# Patient Record
Sex: FEMALE | Race: WHITE | NOT HISPANIC OR LATINO | Employment: UNEMPLOYED | URBAN - METROPOLITAN AREA
[De-identification: names, ages, dates, MRNs, and addresses within clinical notes are randomized per-mention and may not be internally consistent; named-entity substitution may affect disease eponyms.]

---

## 2017-04-04 ENCOUNTER — ALLSCRIPTS OFFICE VISIT (OUTPATIENT)
Dept: OTHER | Facility: OTHER | Age: 67
End: 2017-04-04

## 2017-06-27 ENCOUNTER — ALLSCRIPTS OFFICE VISIT (OUTPATIENT)
Dept: OTHER | Facility: OTHER | Age: 67
End: 2017-06-27

## 2017-10-20 ENCOUNTER — OUTPATIENT (OUTPATIENT)
Dept: OUTPATIENT SERVICES | Facility: HOSPITAL | Age: 67
LOS: 1 days | Discharge: HOME | End: 2017-10-20

## 2017-10-20 DIAGNOSIS — E21.3 HYPERPARATHYROIDISM, UNSPECIFIED: ICD-10-CM

## 2017-10-20 DIAGNOSIS — Q62.11 CONGENITAL OCCLUSION OF URETEROPELVIC JUNCTION: ICD-10-CM

## 2017-10-30 ENCOUNTER — ALLSCRIPTS OFFICE VISIT (OUTPATIENT)
Dept: OTHER | Facility: OTHER | Age: 67
End: 2017-10-30

## 2018-01-10 ENCOUNTER — ALLSCRIPTS OFFICE VISIT (OUTPATIENT)
Dept: OTHER | Facility: OTHER | Age: 68
End: 2018-01-10

## 2018-01-10 NOTE — PSYCH
Psych Med Mgmt    Appearance: was calm and cooperative and good eye contact  Observed mood: depressed and anxious  Observed mood: affect was tearful  Speech: a normal rate  Thought processes: coherent/organized  Hallucinations: no hallucinations present  Thought Content: no delusions  Abnormal Thoughts: The patient has no suicidal thoughts  Orientation: The patient is oriented to person, place and time  Recent and Remote Memory: short term memory intact and long term memory intact  Attention Span And Concentration: concentration intact  Insight: Insight intact  Judgment: Her judgment was intact  Treatment Recommendations: discontinues Klonopin and start Valium 2 mg to be taken at nighttime for insomnia if needed  She reports normal appetite, normal energy level, no weight change and decrease in number of sleep hours   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 [employer]  She is resentful of the administration and her peers, and reports that she is not able to move past her feelings of being mistreated and taken advantage of  Reports that she feels that she is at a loss sometimes because she invested so much into her career that she has little to nothing to look forward to now  Her  is still residing in [place], and she is working on packing up the belongings from the house that she is living in now so that she can move in with him in the near future  Reports that she gets racing thoughts at night and is unable to fall asleep  Klonopin has been ineffective, and patient did not like Trazodone when trialed on it in the past  She previously took Valium for sleep, which was helpful and would like to try that  No SI  Assessment    1  Recurrent major depressive disorder (296 30) (F33 9)   2  Attention-deficit/hyperactivity disorder (314 01) (F90 9)    Plan    1  Methylphenidate HCl ER (CD) 40 MG Oral Capsule Extended Release;  Take 1   capsule twice daily    2  DiazePAM 2 MG Oral Tablet; TAKE 1 TABLET AT BEDTIME AS NEEDED FOR SLEEP    Review of Systems    Constitutional: No fever, no chills, feels well, no tiredness, no recent weight gain or loss  Cardiovascular: no complaints of slow or fast heart rate, no chest pain, no palpitations  Respiratory: no complaints of shortness of breath, no wheezing, no dyspnea on exertion  Gastrointestinal: no complaints of abdominal pain, no constipation, no nausea, no diarrhea, no vomiting  Genitourinary: no complaints of dysuria, no incontinence, no pelvic pain, no urinary frequency  Musculoskeletal: no complaints of arthralgia, no myalgias, no limb pain, no joint stiffness  Integumentary: no complaints of skin rash, no itching, no dry skin  Neurological: no complaints of headache, no confusion, no numbness, no dizziness  Active Problems    1  Attention-deficit/hyperactivity disorder (314 01) (F90 9)   2  Recurrent major depressive disorder (296 30) (F33 9)    Allergies    1  Erythromycin Derivatives   2  Penicillins   3  Sulfa Drugs    Current Meds   1  FluvoxaMINE Maleate 100 MG Oral Tablet; TAKE 2 TABLETS BY MOUTH AT NIGHT; Therapy: 33IIU4250 to (Evaluate:39Ulm9512)  Requested for: 89NRL9491; Last   Rx:08Jun2017 Ordered   2  Methylphenidate HCl ER (CD) 40 MG Oral Capsule Extended Release; Take 1 capsule   twice daily; Therapy: 90VBH4050 to (Last Rx:04Apr2017) Ordered    Social History    · Former smoker (W68 99) (Q25 128)    End of Encounter Meds    1  Methylphenidate HCl ER (CD) 40 MG Oral Capsule Extended Release; Take 1 capsule   twice daily; Therapy: 53FPQ5447 to (Evaluate:18Vxv2717); Last Rx:27Jun2017 Ordered    2  DiazePAM 2 MG Oral Tablet; TAKE 1 TABLET AT BEDTIME AS NEEDED FOR SLEEP; Therapy: 36GYB3968 to (Evaluate:75Vov2089); Last Rx:27Jun2017 Ordered   3  FluvoxaMINE Maleate 100 MG Oral Tablet; TAKE 2 TABLETS BY MOUTH AT NIGHT;    Therapy: 05FTS8782 to (Evaluate:58Qpx7699)  Requested for: 28HPC4418; Last   Rx:15Peh9783 Ordered    Future Appointments    Date/Time Provider Specialty Site   09/26/2017 03:30 PM Nilsa Reid [facility] Psychiatry [facility]     Signatures   Electronically signed by : Diana Bonilla [facility]; Jun 27 2017  3:58PM EST                       (Author)    Electronically signed by : Isis Yarbrough MD; Jun 27 2017  5:00PM EST

## 2018-01-10 NOTE — PSYCH
Psych Med Mgmt    Appearance: was calm and cooperative  Observed mood: mood appropriate  Observed mood: affect appropriate  Speech: a normal rate  Thought processes: coherent/organized  Hallucinations: no hallucinations present  Thought Content: no delusions  Abnormal Thoughts: The patient has no suicidal thoughts and no homicidal thoughts  Orientation: The patient is oriented to person, place and time  Recent and Remote Memory: short term memory intact and long term memory intact  Attention Span And Concentration: concentration intact  Insight: Insight intact  Judgment: Her judgment was intact  Treatment Recommendations: continue of current medications and follow up in 3 months  She reports normal appetite, normal energy level, no weight change and normal number of sleep hours  Patient quit her job in December because she felt that it was too stressful  However, continues to allow her job to stress her out, as she has negative thoughts toward [employer]  She reports that she is 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 " She says that the adjustment has been difficult because she and her  have differences in tv shows, sleeping habits, etc; however, they are working through things well  She reports no issues with sleeping and eating  Assessment    1  Attention-deficit/hyperactivity disorder (314 01) (F90 9)   2  Recurrent major depressive disorder (296 30) (F33 9)    Plan    1  Methylphenidate HCl ER (CD) 40 MG Oral Capsule Extended Release; Take 1   capsule twice daily    2  From  FluvoxaMINE Maleate 100 MG Oral Tablet TAKE 3 TABLETS BY MOUTH   AT NIGHT To FluvoxaMINE Maleate 100 MG Oral Tablet TAKE 2 TABLETS AT BEDTIME   3  ClonazePAM 2 MG Oral Tablet; Take 1 tablet by mouth at bedtime    Review of Systems    Constitutional: No fever, no chills, feels well, no tiredness, no recent weight gain or loss     Cardiovascular: no complaints of slow or fast heart rate, no chest pain, no palpitations  Respiratory: no complaints of shortness of breath, no wheezing, no dyspnea on exertion  Gastrointestinal: no complaints of abdominal pain, no constipation, no nausea, no diarrhea, no vomiting  Genitourinary: no complaints of dysuria, no incontinence, no pelvic pain, no urinary frequency  Musculoskeletal: no complaints of arthralgia, no myalgias, no limb pain, no joint stiffness  Integumentary: no complaints of skin rash, no itching, no dry skin  Neurological: no complaints of headache, no confusion, no numbness, no dizziness  Active Problems    1  Attention-deficit/hyperactivity disorder (314 01) (F90 9)   2  Recurrent major depressive disorder (296 30) (F33 9)    Allergies    1  Erythromycin Derivatives   2  Penicillins   3  Sulfa Drugs    Current Meds   1  ClonazePAM 2 MG Oral Tablet; Take 1 tablet by mouth at bedtime; Therapy: 29APR6856 to (Evaluate:05Ryz0688)  Requested for: 21Nov2016; Last   Rx:21Nov2016 Ordered   2  FluvoxaMINE Maleate 100 MG Oral Tablet; TAKE 3 TABLETS BY MOUTH AT NIGHT; Therapy: 34NHE0094 to (Evaluate:74Ciw2966)  Requested for: 21Nov2016; Last   Rx:21Nov2016 Ordered   3  Methylphenidate HCl ER (CD) 40 MG Oral Capsule Extended Release; Take 1 capsule   twice daily; Therapy: 36HXL9038 to (Last Rx:21Nov2016) Ordered    Social History    · Former smoker (P18 56) (X29 010)    End of Encounter Meds    1  Methylphenidate HCl ER (CD) 40 MG Oral Capsule Extended Release; Take 1 capsule   twice daily; Therapy: 02ACC2456 to (Last Rx:04Apr2017) Ordered    2  ClonazePAM 2 MG Oral Tablet; Take 1 tablet by mouth at bedtime; Therapy: 64SYQ4458 to (Evaluate:36Ico4955)  Requested for: 04Apr2017; Last   Rx:04Apr2017 Ordered   3  FluvoxaMINE Maleate 100 MG Oral Tablet; TAKE 2 TABLETS AT BEDTIME;    Therapy: 81RKM9421 to ([facility])  Requested for: 04Apr2017; Last   Rx:04Apr2017 Ordered    Future Appointments    Date/Time Provider Specialty Site   06/27/2017 03:00 PM Nasima Perez [facility] Psychiatry [address] [facility]     Signatures   Electronically signed by : MT Desir;  Apr 4 2017  5:12PM EST                       (Author)    Electronically signed by : Kevin Riley MD; Apr 5 2017  4:49PM EST

## 2018-01-12 NOTE — PSYCH
Psych Med Mgmt    Appearance: was calm and cooperative and adequate hygiene and grooming  Observed mood: depressed  Observed mood: affect was constricted and affect was tearful  Speech: a normal rate  Thought processes: coherent/organized  Hallucinations: no hallucinations present  Thought Content: no delusions  Abnormal Thoughts: The patient has no suicidal thoughts and no homicidal thoughts  Orientation: The patient is oriented to person, place and time  Recent and Remote Memory: short term memory intact and long term memory intact  Attention Span And Concentration: concentration intact  Insight: Insight intact  Judgment: Her judgment was intact  Muscle Strength And Tone  Muscle strength and tone were normal  Normal gait and station  The patient is experiencing no localized pain  Treatment Recommendations: resume clonazepam to alleviate anxiety  Increase fluvoxamine to 150 mg b i d  Continue with methylphenidate ER 40 mg b i d  continue with psychotherapy  Risks, Benefits And Possible Side Effects Of Medications: Risks, benefits, and possible side effects of medications explained to patient and patient verbalizes understanding  She reports increased appetite, decreased energy, recent lbs weight gain  and decrease in number of sleep hours   Seen for continuing care and pharmacotherapy  She is depressed and anxious due to a number of stressors  She got a bad performance evaluation at work ,second time in 33 years  She also had to put one of her dogs to sleep  Her  is getting frustrated with her constant complaints about [employer]  She gets easily humiliated and attributes this to how her mother had been critical of her  She can not sleep well  Has a craving for chocolate and has gaind a few lbs  Assessment    1  Recurrent major depressive disorder (296 30) (F33 9)   2  Attention-deficit hyperactivity disorder (314 01) (F90 9)    Plan    1   Methylphenidate HCl ER (CD) 40 MG Oral Capsule Extended Release; Take 1   capsule twice daily    2  ClonazePAM 2 MG Oral Tablet; Take 1 tablet by mouth at bedtime   3  FluvoxaMINE Maleate 100 MG Oral Tablet; TAKE 3 TABLETS BY MOUTH AT   NIGHT    Review of Systems    Constitutional: feeling tired  Cardiovascular: no complaints of slow or fast heart rate, no chest pain, no palpitations  Respiratory: no complaints of shortness of breath, no wheezing, no dyspnea on exertion  Gastrointestinal: no complaints of abdominal pain, no constipation, no nausea, no diarrhea, no vomiting  Genitourinary: no complaints of dysuria, no incontinence, no pelvic pain, no urinary frequency  Musculoskeletal: no complaints of arthralgia, no myalgias, no limb pain, no joint stiffness  Integumentary: no complaints of skin rash, no itching, no dry skin  Neurological: no complaints of headache, no confusion, no numbness, no dizziness  Active Problems    1  Attention-deficit hyperactivity disorder (314 01) (F90 9)   2  Recurrent major depressive disorder (296 30) (F33 9)    Allergies    1  Erythromycin Derivatives   2  Penicillins   3  Sulfa Drugs    Current Meds   1  ClonazePAM 2 MG Oral Tablet; Take 1 tablet by mouth at bedtime; Therapy: 01WFI9057 to (Evaluate:42Jcc2388)  Requested for: 50GET0288; Last   Rx:92Ejh7366 Ordered   2  FluvoxaMINE Maleate 100 MG Oral Tablet; TAKE 2 TABLETS BY MOUTH AT NIGHT; Therapy: 73BMY2551 to (Epifanio Gaines)  Requested for: 12Oct2015; Last   Rx:12Oct2015 Ordered   3  Methylphenidate HCl ER (CD) 40 MG Oral Capsule Extended Release; Take 1 capsule   twice daily; Therapy: 15KTR3815 to (Last Rx:12Oct2015) Ordered    Social History    · Former smoker (P52 67) (Z79 [id number])    End of Encounter Meds    1  Methylphenidate HCl ER (CD) 40 MG Oral Capsule Extended Release; Take 1 capsule   twice daily; Therapy: 26FOP9199 to (Last GD:91HNZ6314) Ordered    2  ClonazePAM 2 MG Oral Tablet;  Take 1 tablet by mouth at bedtime; Therapy: 03BBX9076 to (Evaluate:40Vgh7012)  Requested for: 61QJF2398; Last   Rx:61Dfj5271 Ordered   3  FluvoxaMINE Maleate 100 MG Oral Tablet; TAKE 3 TABLETS BY MOUTH AT NIGHT;    Therapy: 08LNV5334 to (Baldev Stewart)  Requested for: 70XPA0050; Last   YK:09MZE9594 Ordered    Signatures   Electronically signed by : Milton Cantu MD; May  3 2016  4:32PM EST                       (Author)

## 2018-01-14 NOTE — PSYCH
Psych Med Mgmt    Appearance: was calm and cooperative  Observed mood: depressed  Observed mood: affect appropriate  Speech: a normal rate  Thought processes: coherent/organized  Hallucinations: no hallucinations present  Thought Content: no delusions  Abnormal Thoughts: The patient has no suicidal thoughts  Orientation: The patient is oriented to person, place and time  Recent and Remote Memory: short term memory intact and long term memory intact  Attention Span And Concentration: concentration intact  Insight: Insight intact  Judgment: Her judgment was intact  Treatment Recommendations: increase Luvox to 300 mg HS  She reports normal appetite, decreased energy, no weight change and decrease in number of sleep hours   Patient reports an increase in depressive symptoms  She was recently diagnosed with hypercalcemia and after a nuclear scan, doctors are questioning whether or not patient has a parathyroid tumor  They have not yet given her a final diagnosis  Patient is wondering if this has to do with her increasing depression  She has not been able to sleep, she does not have interest in doing things that she used to enjoy, she does not like to go out of the house, and overall has been feeling apathetic toward the things going on in her life, such as selling one of her houses  Assessment    1  Recurrent major depressive disorder (296 30) (F33 9)   2  Attention-deficit/hyperactivity disorder (314 01) (F90 9)    Plan    1  Methylphenidate HCl ER (CD) 40 MG Oral Capsule Extended Release; Take 1   capsule twice daily    2  DiazePAM 2 MG Oral Tablet; TAKE 1 TABLET AT BEDTIME AS NEEDED FOR   SLEEP   3  FluvoxaMINE Maleate 100 MG Oral Tablet; TAKE 3 TABLETS BY MOUTH AT   NIGHT    Review of Systems    Constitutional: feeling poorly and feeling tired  Cardiovascular: no complaints of slow or fast heart rate, no chest pain, no palpitations     Respiratory: no complaints of shortness of breath, no wheezing, no dyspnea on exertion  Gastrointestinal: no complaints of abdominal pain, no constipation, no nausea, no diarrhea, no vomiting  Genitourinary: no complaints of dysuria, no incontinence, no pelvic pain, no urinary frequency  Musculoskeletal: no complaints of arthralgia, no myalgias, no limb pain, no joint stiffness  Integumentary: no complaints of skin rash, no itching, no dry skin  Neurological: no complaints of headache, no confusion, no numbness, no dizziness  Active Problems    1  Attention-deficit/hyperactivity disorder (314 01) (F90 9)   2  Recurrent major depressive disorder (296 30) (F33 9)    Allergies    1  Erythromycin Derivatives   2  Penicillins   3  Sulfa Drugs    Current Meds   1  DiazePAM 2 MG Oral Tablet; TAKE 1 TABLET AT BEDTIME AS NEEDED FOR SLEEP; Therapy: 23ODA3860 to (Evaluate:59Atw2224); Last Rx:27Jun2017 Ordered   2  FluvoxaMINE Maleate 100 MG Oral Tablet; TAKE 2 TABLETS BY MOUTH AT NIGHT; Therapy: 01LKA2527 to (Evaluate:92Bax5707)  Requested for: 96MWX3535; Last   Rx:08Jun2017 Ordered   3  Methylphenidate HCl ER (CD) 40 MG Oral Capsule Extended Release; Take 1 capsule   twice daily; Therapy: 77EFG6683 to (Evaluate:35Ugx3781); Last Rx:27Jun2017 Ordered    Social History    · Former smoker (R55 94) (P09 964)    End of Encounter Meds    1  Methylphenidate HCl ER (CD) 40 MG Oral Capsule Extended Release; Take 1 capsule   twice daily; Therapy: 88EXY7599 to (Evaluate:29Nov2017); Last Rx:30Oct2017 Ordered    2  DiazePAM 2 MG Oral Tablet; TAKE 1 TABLET AT BEDTIME AS NEEDED FOR SLEEP; Therapy: 85OAS3528 to (Evaluate:28Jan2018); Last Rx:30Oct2017 Ordered   3  FluvoxaMINE Maleate 100 MG Oral Tablet; TAKE 3 TABLETS BY MOUTH AT NIGHT;    Therapy: 34HXO2178 to 96 058587)  Requested for: 09NEB3770; Last   Rx:30Oct2017 Ordered    Signatures   Electronically signed by : Sparkle Donnelly; Oct 30 2017  5:17PM EST                       (Author) Electronically signed by : Parth Rios MD; Oct 31 2017  4:35PM EST

## 2018-01-15 NOTE — PSYCH
Psych Med Mgmt    Appearance: was calm and cooperative, adequate hygiene and grooming and good eye contact  Observed mood: was dysphoric  Observed mood: affect was broad, but affect appropriate  Speech: a normal rate  Thought processes: coherent/organized  Hallucinations: no hallucinations present  Thought Content: no delusions  Abnormal Thoughts: The patient has no suicidal thoughts and no homicidal thoughts  Orientation: The patient is oriented to person, place and time  Recent and Remote Memory: short term memory intact and long term memory intact  Attention Span And Concentration: concentration intact  Insight: Insight intact  Judgment: Her judgment was intact  Muscle Strength And Tone  Muscle strength and tone were normal  Normal gait and station  The patient is experiencing no localized pain  She reports increased appetite, decreased energy, no weight change and decrease in number of sleep hours   Seen for depression and ADHD  She has conflicts with her boss  Talked about multiple losses she has had over the past few years  She is not motivated to do much  disturbed sleep  overeating  Is thinking about retiring  she considers her work as a major stressor  she also has a number of health problems  Assessment    1  Recurrent major depressive disorder (296 30) (F33 9)   2  Attention-deficit/hyperactivity disorder (314 01) (F90 9)    Plan    1  Methylphenidate HCl ER (CD) 40 MG Oral Capsule Extended Release; Take 1   capsule twice daily    2  ClonazePAM 2 MG Oral Tablet; Take 1 tablet by mouth at bedtime   3  FluvoxaMINE Maleate 100 MG Oral Tablet; TAKE 3 TABLETS BY MOUTH AT   NIGHT    Review of Systems    Constitutional: No fever, no chills, feels well, no tiredness, no recent weight gain or loss  Cardiovascular: no complaints of slow or fast heart rate, no chest pain, no palpitations     Respiratory: no complaints of shortness of breath, no wheezing, no dyspnea on exertion  Gastrointestinal: no complaints of abdominal pain, no constipation, no nausea, no diarrhea, no vomiting  Genitourinary: no complaints of dysuria, no incontinence, no pelvic pain, no urinary frequency  Musculoskeletal: no complaints of arthralgia, no myalgias, no limb pain, no joint stiffness  Integumentary: no complaints of skin rash, no itching, no dry skin  Active Problems    1  Attention-deficit/hyperactivity disorder (314 01) (F90 9)   2  Recurrent major depressive disorder (296 30) (F33 9)    Allergies    1  Erythromycin Derivatives   2  Penicillins   3  Sulfa Drugs    Current Meds   1  ClonazePAM 2 MG Oral Tablet; Take 1 tablet by mouth at bedtime; Therapy: 31GRU4165 to (Evaluate:13Nov2016)  Requested for: 70Iqg9653; Last   Rx:15Aug2016 Ordered   2  FluvoxaMINE Maleate 100 MG Oral Tablet; TAKE 3 TABLETS BY MOUTH AT NIGHT; Therapy: 14WLP3828 to (Everlene Jewels)  Requested for: 55GTG1772; Last   Rx:03May2016 Ordered   3  Methylphenidate HCl ER (CD) 40 MG Oral Capsule Extended Release; Take 1 capsule   twice daily; Therapy: 04AMO3731 to (Last Rx:26Oct2016) Ordered    Social History    · Former smoker (E69 80) (K43 901)    End of Encounter Meds    1  Methylphenidate HCl ER (CD) 40 MG Oral Capsule Extended Release; Take 1 capsule   twice daily; Therapy: 63YTE5533 to (Last Rx:21Nov2016) Ordered    2  ClonazePAM 2 MG Oral Tablet; Take 1 tablet by mouth at bedtime; Therapy: 39CSR4348 to (Evaluate:89Oxw3196)  Requested for: 21Nov2016; Last   Rx:21Nov2016 Ordered   3  FluvoxaMINE Maleate 100 MG Oral Tablet; TAKE 3 TABLETS BY MOUTH AT NIGHT;    Therapy: 20SYQ4534 to (Evaluate:50Ocv4469)  Requested for: 21Nov2016; Last   Rx:21Nov2016 Ordered    Signatures   Electronically signed by : Fadia Plummer MD; Nov 21 2016  4:15PM EST                       (Author)

## 2018-01-16 NOTE — MISCELLANEOUS
Message   Recorded as Task   Date: 10/26/2016 12:10 PM, Created By: Tali Valladares   Task Name: Care Coordination   Assigned To:  Desean Desir   Regarding Patient: Saba Perales, Status: Active   CommentFrancis Jaksch - 26 Oct 2016 12:10 PM     TASK CREATED  Caller: Self; [phone] (Home)  adderoll script  will  tomorrow   Prescription for Adderall      Signatures   Electronically signed by : Cassia Duncan MD; Oct 26 2016  5:13PM EST                       (Author)

## 2018-01-23 NOTE — PSYCH
Psych Med Mgmt    Treatment Recommendations: continue with same medications and RTO in 4 months  The patient may take one extra dose of diazepam for sleep  Risks, Benefits And Possible Side Effects Of Medications: Risks, benefits, and possible side effects of medications explained to patient and patient verbalizes understanding  She reports normal appetite, normal energy level, no weight change and decrease in number of sleep hours   seen for depression and ADHD  Several months ago she had crying spell and medications were increased to current dose  She recently retired because she didn't agree with work assignments offered to her by the new   She now feels like a failure  Her  is still working at age 81  Patient is wondering what she needs to do with her life  Assessment    1  Attention-deficit/hyperactivity disorder (314 01) (F90 9)   2  Recurrent major depressive disorder (296 30) (F33 9)    Plan    1  Methylphenidate HCl ER (CD) 40 MG Oral Capsule Extended Release; Take 1   capsule twice daily    2  DiazePAM 2 MG Oral Tablet; TAKE 1 TABLET AT BEDTIME AS NEEDED FOR   SLEEP   3  FluvoxaMINE Maleate 100 MG Oral Tablet; TAKE 3 TABLETS BY MOUTH AT   NIGHT    Review of Systems    Constitutional: No fever, no chills, feels well, no tiredness, no recent weight gain or loss  Cardiovascular: no complaints of slow or fast heart rate, no chest pain, no palpitations  Respiratory: no complaints of shortness of breath, no wheezing, no dyspnea on exertion  Gastrointestinal: no complaints of abdominal pain, no constipation, no nausea, no diarrhea, no vomiting  Genitourinary: no complaints of dysuria, no incontinence, no pelvic pain, no urinary frequency  Musculoskeletal: no complaints of arthralgia, no myalgias, no limb pain, no joint stiffness  Integumentary: no complaints of skin rash, no itching, no dry skin     Neurological: no complaints of headache, no confusion, no numbness, no dizziness  Active Problems    1  Attention-deficit/hyperactivity disorder (314 01) (F90 9)   2  Recurrent major depressive disorder (296 30) (F33 9)    Allergies    1  Erythromycin Derivatives   2  Penicillins   3  Sulfa Drugs    Current Meds   1  DiazePAM 2 MG Oral Tablet; TAKE 1 TABLET AT BEDTIME AS NEEDED FOR SLEEP; Therapy: [id number] to (Evaluate:28Jan2018); Last Rx:30Oct2017 Ordered   2  FluvoxaMINE Maleate 100 MG Oral Tablet; TAKE 3 TABLETS BY MOUTH AT NIGHT; Therapy: [id number] to (Evaluate:06Feb2018)  Requested for: [id number]; Last   Rx:08Nov2017 Ordered   3  Methylphenidate HCl ER (CD) 40 MG Oral Capsule Extended Release; Take 1 capsule   twice daily; Therapy: [id number] to (Evaluate:29Nov2017); Last Rx:30Oct2017 Ordered    Social History    · Former smoker (S64 57) (Q84 881)    End of Encounter Meds    1  Methylphenidate HCl ER (CD) 40 MG Oral Capsule Extended Release; Take 1 capsule   twice daily; Therapy: [id number] to (Evaluate:09Feb2018); Last Rx:10Jan2018 Ordered    2  DiazePAM 2 MG Oral Tablet; TAKE 1 TABLET AT BEDTIME AS NEEDED FOR SLEEP; Therapy: [id number] to (Evaluate:78Jbi1881); Last Rx:10Jan2018 Ordered   3  FluvoxaMINE Maleate 100 MG Oral Tablet; TAKE 3 TABLETS BY MOUTH AT NIGHT; Therapy: [id number] to (Evaluate:93Bvw5102)  Requested for: [id number];  Last   Rx:10Jan2018 Ordered    Signatures   Electronically signed by : Davina Delgado MD; Keyon 10 2018  3:14PM EST                       (Author)

## 2018-05-07 ENCOUNTER — TELEPHONE (OUTPATIENT)
Dept: PSYCHIATRY | Facility: CLINIC | Age: 68
End: 2018-05-07

## 2018-05-08 DIAGNOSIS — F90.2 ATTENTION DEFICIT HYPERACTIVITY DISORDER (ADHD), COMBINED TYPE: Primary | ICD-10-CM

## 2018-05-08 RX ORDER — METHYLPHENIDATE HYDROCHLORIDE 40 MG/1
1 CAPSULE, EXTENDED RELEASE ORAL 2 TIMES DAILY
COMMUNITY
Start: 2015-10-12 | End: 2018-05-08 | Stop reason: SDUPTHER

## 2018-05-08 RX ORDER — METHYLPHENIDATE HYDROCHLORIDE 40 MG/1
40 CAPSULE, EXTENDED RELEASE ORAL 2 TIMES DAILY
Qty: 60 CAPSULE | Refills: 0 | Status: SHIPPED | OUTPATIENT
Start: 2018-05-08 | End: 2018-06-25 | Stop reason: SDUPTHER

## 2018-06-25 ENCOUNTER — OFFICE VISIT (OUTPATIENT)
Dept: PSYCHIATRY | Facility: CLINIC | Age: 68
End: 2018-06-25
Payer: COMMERCIAL

## 2018-06-25 DIAGNOSIS — F33.40 RECURRENT MAJOR DEPRESSIVE DISORDER, IN REMISSION (HCC): ICD-10-CM

## 2018-06-25 DIAGNOSIS — F90.2 ATTENTION DEFICIT HYPERACTIVITY DISORDER (ADHD), COMBINED TYPE: ICD-10-CM

## 2018-06-25 DIAGNOSIS — F90.2 ADHD (ATTENTION DEFICIT HYPERACTIVITY DISORDER), COMBINED TYPE: Primary | ICD-10-CM

## 2018-06-25 DIAGNOSIS — F33.1 MODERATE EPISODE OF RECURRENT MAJOR DEPRESSIVE DISORDER (HCC): ICD-10-CM

## 2018-06-25 PROCEDURE — 99213 OFFICE O/P EST LOW 20 MIN: CPT | Performed by: PSYCHIATRY & NEUROLOGY

## 2018-06-25 RX ORDER — METHYLPHENIDATE HYDROCHLORIDE 40 MG/1
40 CAPSULE, EXTENDED RELEASE ORAL 2 TIMES DAILY
Qty: 60 CAPSULE | Refills: 0 | Status: SHIPPED | OUTPATIENT
Start: 2018-06-25 | End: 2018-11-26 | Stop reason: SDUPTHER

## 2018-06-25 RX ORDER — METHYLPHENIDATE HYDROCHLORIDE 40 MG/1
40 CAPSULE, EXTENDED RELEASE ORAL 2 TIMES DAILY
Qty: 60 CAPSULE | Refills: 0 | Status: SHIPPED | OUTPATIENT
Start: 2018-06-25 | End: 2018-06-25 | Stop reason: SDUPTHER

## 2018-06-25 RX ORDER — FLUVOXAMINE MALEATE 100 MG
3 TABLET ORAL
COMMUNITY
Start: 2015-07-22 | End: 2018-06-25 | Stop reason: SDUPTHER

## 2018-06-25 RX ORDER — METHYLPHENIDATE HYDROCHLORIDE 40 MG/1
CAPSULE, EXTENDED RELEASE ORAL
Qty: 60 CAPSULE | Refills: 0 | Status: SHIPPED | OUTPATIENT
Start: 2018-06-25 | End: 2018-06-25 | Stop reason: SDUPTHER

## 2018-06-25 RX ORDER — ALPRAZOLAM 0.5 MG/1
0.5 TABLET ORAL
Qty: 30 TABLET | Refills: 4 | Status: SHIPPED | OUTPATIENT
Start: 2018-06-25 | End: 2018-11-26 | Stop reason: SDUPTHER

## 2018-06-25 RX ORDER — FLUVOXAMINE MALEATE 100 MG
300 TABLET ORAL
Qty: 90 TABLET | Refills: 4 | Status: SHIPPED | OUTPATIENT
Start: 2018-06-25 | End: 2018-11-26 | Stop reason: SDUPTHER

## 2018-06-25 NOTE — PSYCH
Patient ID: Kirill Llanes is a 79 y o  female  HPI ROS Appetite Changes and Sleep: normal appetite, decreased energy, no weight change and normal number of sleep hours    Review Of Systems:     Mood Anxiety and Depression   Thought Content Normal   General Emotional Problems and Decreased Functioning   Gastrointestinal As Noted in HPI   Neurological As Noted in HPI       Laboratory Results: No results found for this or any previous visit  Subjective:      Feels unmotivated and depressed  She is not functioning the way she should  She spends a lot of time in bed  Denies suicidal ideation  Has been compliant with medications  Denies stressors    Objective: Moderate depressive symptoms  Mental status:  Appearance calm and cooperative , adequate hygiene and grooming and good eye contact    Mood depressed   Affect affect was constricted   Speech Normal rate and Normal volume   Thought Processes coherent/organized   Hallucinations no hallucinations present    Thought Content no delusional thoughts verbalized   Abnormal Thoughts no suicidal thoughts  and no homicidal thoughts    Orientation A+O x 3   Memory function Not tested   Attention Span concentration impaired   Intellect Not Formally Assessed   Insight Insight intact   Judgement judgment was intact   Muscle Strength Muscle strength and tone were normal and Normal gait    Language no difficulty naming common objects and no difficulty repeating a phrase    Pain none       Assessment/Plan:       There are no diagnoses linked to this encounter          Treatment Recommendations- Risks Benefits    We will add Latuda 20 mg daily for the purpose of augmentation  Risks, Benefits And Possible Side Effects Of Medications:  Risks, benefits, and possible side effects of medications explained to patient and patient verbalizes understanding

## 2018-11-26 ENCOUNTER — TELEPHONE (OUTPATIENT)
Dept: PSYCHIATRY | Facility: CLINIC | Age: 68
End: 2018-11-26

## 2018-11-26 DIAGNOSIS — F33.40 RECURRENT MAJOR DEPRESSIVE DISORDER, IN REMISSION (HCC): ICD-10-CM

## 2018-11-26 DIAGNOSIS — F90.2 ATTENTION DEFICIT HYPERACTIVITY DISORDER (ADHD), COMBINED TYPE: ICD-10-CM

## 2018-11-26 RX ORDER — METHYLPHENIDATE HYDROCHLORIDE 40 MG/1
40 CAPSULE, EXTENDED RELEASE ORAL 2 TIMES DAILY
Qty: 60 CAPSULE | Refills: 0 | Status: SHIPPED | OUTPATIENT
Start: 2018-11-26 | End: 2018-12-17 | Stop reason: SDUPTHER

## 2018-11-26 RX ORDER — ALPRAZOLAM 0.5 MG/1
0.5 TABLET ORAL
Qty: 30 TABLET | Refills: 0 | Status: SHIPPED | OUTPATIENT
Start: 2018-11-26 | End: 2018-12-27 | Stop reason: SDUPTHER

## 2018-11-26 RX ORDER — FLUVOXAMINE MALEATE 100 MG
300 TABLET ORAL
Qty: 90 TABLET | Refills: 0 | Status: SHIPPED | OUTPATIENT
Start: 2018-11-26 | End: 2018-12-27 | Stop reason: SDUPTHER

## 2018-12-17 ENCOUNTER — TELEPHONE (OUTPATIENT)
Dept: PSYCHIATRY | Facility: CLINIC | Age: 68
End: 2018-12-17

## 2018-12-17 DIAGNOSIS — F90.2 ATTENTION DEFICIT HYPERACTIVITY DISORDER (ADHD), COMBINED TYPE: ICD-10-CM

## 2018-12-17 RX ORDER — METHYLPHENIDATE HYDROCHLORIDE 40 MG/1
40 CAPSULE, EXTENDED RELEASE ORAL 2 TIMES DAILY
Qty: 60 CAPSULE | Refills: 0 | Status: SHIPPED | OUTPATIENT
Start: 2018-12-17 | End: 2018-12-18 | Stop reason: SDUPTHER

## 2018-12-18 ENCOUNTER — TELEPHONE (OUTPATIENT)
Dept: PSYCHIATRY | Facility: CLINIC | Age: 68
End: 2018-12-18

## 2018-12-18 DIAGNOSIS — F90.2 ATTENTION DEFICIT HYPERACTIVITY DISORDER (ADHD), COMBINED TYPE: ICD-10-CM

## 2018-12-18 RX ORDER — METHYLPHENIDATE HYDROCHLORIDE 40 MG/1
40 CAPSULE, EXTENDED RELEASE ORAL 2 TIMES DAILY
Qty: 30 CAPSULE | Refills: 0 | Status: SHIPPED | OUTPATIENT
Start: 2018-12-18 | End: 2018-12-27 | Stop reason: SDUPTHER

## 2018-12-27 ENCOUNTER — OFFICE VISIT (OUTPATIENT)
Dept: PSYCHIATRY | Facility: CLINIC | Age: 68
End: 2018-12-27
Payer: COMMERCIAL

## 2018-12-27 DIAGNOSIS — F33.40 RECURRENT MAJOR DEPRESSIVE DISORDER, IN REMISSION (HCC): ICD-10-CM

## 2018-12-27 DIAGNOSIS — F90.2 ATTENTION DEFICIT HYPERACTIVITY DISORDER (ADHD), COMBINED TYPE: ICD-10-CM

## 2018-12-27 DIAGNOSIS — F90.2 ADHD (ATTENTION DEFICIT HYPERACTIVITY DISORDER), COMBINED TYPE: ICD-10-CM

## 2018-12-27 DIAGNOSIS — F33.42 RECURRENT MAJOR DEPRESSIVE DISORDER, IN FULL REMISSION (HCC): Primary | ICD-10-CM

## 2018-12-27 PROCEDURE — 99213 OFFICE O/P EST LOW 20 MIN: CPT | Performed by: PSYCHIATRY & NEUROLOGY

## 2018-12-27 RX ORDER — FLUVOXAMINE MALEATE 100 MG
300 TABLET ORAL
Qty: 270 TABLET | Refills: 1 | Status: SHIPPED | OUTPATIENT
Start: 2018-12-27 | End: 2019-04-02 | Stop reason: SDUPTHER

## 2018-12-27 RX ORDER — METHYLPHENIDATE HYDROCHLORIDE 40 MG/1
40 CAPSULE, EXTENDED RELEASE ORAL 2 TIMES DAILY
Qty: 60 CAPSULE | Refills: 0 | Status: SHIPPED | OUTPATIENT
Start: 2018-12-27 | End: 2018-12-27 | Stop reason: SDUPTHER

## 2018-12-27 RX ORDER — METHYLPHENIDATE HYDROCHLORIDE 40 MG/1
40 CAPSULE, EXTENDED RELEASE ORAL 2 TIMES DAILY
Qty: 60 CAPSULE | Refills: 0 | Status: SHIPPED | OUTPATIENT
Start: 2018-12-27 | End: 2019-04-02

## 2018-12-27 RX ORDER — ALPRAZOLAM 0.5 MG/1
0.5 TABLET ORAL
Qty: 90 TABLET | Refills: 1 | Status: SHIPPED | OUTPATIENT
Start: 2018-12-27 | End: 2019-04-02 | Stop reason: SDUPTHER

## 2018-12-27 NOTE — PSYCH
Patient ID: Scott Valdes is a 79 y o  female  HPI ROS Appetite Changes and Sleep: Normal appetite- No weight gain- Energy level is low-sleeps well    Review Of Systems:     Mood Normal   Thought Content Normal   General Normal    Gastrointestinal Normal   Neurological Normal        Laboratory Results: No results found for this or any previous visit  Subjective:       Seen for depression and ADHD  Still feels tired and thinks it is related to hypercalcemia  Her endocrinologist did not want her to take Latuda  A thyroid ultrasound did not show any parathyroid abnormalities  Objective:     stable  Mental status:  Appearance calm and cooperative , adequate hygiene and grooming and good eye contact    Mood euthymic   Affect affect was broad   Speech Normal rate and Normal volume   Thought Processes coherent/organized   Hallucinations no hallucinations present    Thought Content no delusional thoughts verbalized   Abnormal Thoughts no suicidal thoughts  and no homicidal thoughts    Orientation A+O x 3   Memory function Not tested   Attention Span concentration intact   Intellect Not Formally Assessed   Insight Insight intact   Judgement judgment was intact   Muscle Strength Muscle strength and tone were normal and Normal gait    Language no difficulty naming common objects and no difficulty repeating a phrase    Pain none       Assessment/Plan:       Diagnoses and all orders for this visit:    Recurrent major depressive disorder, in full remission ([facility] [address] )    ADHD (attention deficit hyperactivity disorder), combined type    Recurrent major depressive disorder, in remission ([facility] [address] )  -     ALPRAZolam (XANAX) 0 5 mg tablet; Take 1 tablet (0 5 mg total) by mouth daily at bedtime as needed for anxiety  -     fluvoxaMINE (LUVOX) 100 mg tablet;  Take 3 tablets (300 mg total) by mouth daily at bedtime    Attention deficit hyperactivity disorder (ADHD), combined type  -     Discontinue: methylphenidate (Yeny العراقي CD) 40 MG CR capsule; Take 1 capsule (40 mg total) by mouth 2 (two) times a day Max Daily Amount: 40 mg  -     Discontinue: methylphenidate (METADATE CD) 40 MG CR capsule; Take 1 capsule (40 mg total) by mouth 2 (two) times a day Max Daily Amount: 40 mg  -     methylphenidate (METADATE CD) 40 MG CR capsule;  Take 1 capsule (40 mg total) by mouth 2 (two) times a day Max Daily Amount: 40 mg            Treatment Recommendations- Risks Benefits      Risks, Benefits And Possible Side Effects Of Medications:  Risks, benefits, and possible side effects of medications explained to patient and patient verbalizes understanding

## 2019-04-02 ENCOUNTER — OFFICE VISIT (OUTPATIENT)
Dept: PSYCHIATRY | Facility: CLINIC | Age: 69
End: 2019-04-02
Payer: COMMERCIAL

## 2019-04-02 DIAGNOSIS — F90.2 ATTENTION DEFICIT HYPERACTIVITY DISORDER (ADHD), COMBINED TYPE: ICD-10-CM

## 2019-04-02 DIAGNOSIS — F33.40 RECURRENT MAJOR DEPRESSIVE DISORDER, IN REMISSION (HCC): ICD-10-CM

## 2019-04-02 PROCEDURE — 99213 OFFICE O/P EST LOW 20 MIN: CPT | Performed by: PSYCHIATRY & NEUROLOGY

## 2019-04-02 RX ORDER — METHYLPHENIDATE HYDROCHLORIDE 40 MG/1
40 CAPSULE, EXTENDED RELEASE ORAL DAILY
Qty: 30 CAPSULE | Refills: 0 | Status: SHIPPED | OUTPATIENT
Start: 2019-04-02 | End: 2019-04-02 | Stop reason: SDUPTHER

## 2019-04-02 RX ORDER — ALPRAZOLAM 0.5 MG/1
0.5 TABLET ORAL
Qty: 90 TABLET | Refills: 1 | Status: SHIPPED | OUTPATIENT
Start: 2019-04-02 | End: 2019-08-13 | Stop reason: SDUPTHER

## 2019-04-02 RX ORDER — FLUVOXAMINE MALEATE 100 MG
300 TABLET ORAL
Qty: 270 TABLET | Refills: 1 | Status: SHIPPED | OUTPATIENT
Start: 2019-04-02 | End: 2019-08-13 | Stop reason: SDUPTHER

## 2019-04-02 RX ORDER — METHYLPHENIDATE HYDROCHLORIDE 40 MG/1
40 CAPSULE, EXTENDED RELEASE ORAL DAILY
Qty: 30 CAPSULE | Refills: 0 | Status: SHIPPED | OUTPATIENT
Start: 2019-04-02 | End: 2019-08-13 | Stop reason: SDUPTHER

## 2019-06-23 ENCOUNTER — EMERGENCY (EMERGENCY)
Facility: HOSPITAL | Age: 69
LOS: 0 days | Discharge: HOME | End: 2019-06-23
Attending: EMERGENCY MEDICINE | Admitting: EMERGENCY MEDICINE
Payer: COMMERCIAL

## 2019-06-23 VITALS
TEMPERATURE: 98 F | SYSTOLIC BLOOD PRESSURE: 113 MMHG | OXYGEN SATURATION: 97 % | HEART RATE: 85 BPM | RESPIRATION RATE: 20 BRPM | DIASTOLIC BLOOD PRESSURE: 61 MMHG

## 2019-06-23 DIAGNOSIS — S52.571A OTHER INTRAARTICULAR FRACTURE OF LOWER END OF RIGHT RADIUS, INITIAL ENCOUNTER FOR CLOSED FRACTURE: ICD-10-CM

## 2019-06-23 DIAGNOSIS — S69.90XA UNSPECIFIED INJURY OF UNSPECIFIED WRIST, HAND AND FINGER(S), INITIAL ENCOUNTER: ICD-10-CM
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
## 2019-06-23 DIAGNOSIS — S52.502A UNSPECIFIED FRACTURE OF THE LOWER END OF LEFT RADIUS, INITIAL ENCOUNTER FOR CLOSED FRACTURE: ICD-10-CM

## 2019-06-23 DIAGNOSIS — S52.611A DISPLACED FRACTURE OF RIGHT ULNA STYLOID PROCESS, INITIAL ENCOUNTER FOR CLOSED FRACTURE: ICD-10-CM

## 2019-06-23 PROCEDURE — 73110 X-RAY EXAM OF WRIST: CPT | Mod: 26,50

## 2019-06-23 PROCEDURE — 99284 EMERGENCY DEPT VISIT MOD MDM: CPT | Mod: 25

## 2019-06-23 PROCEDURE — 29125 APPL SHORT ARM SPLINT STATIC: CPT | Mod: 50

## 2019-06-23 PROCEDURE — 73130 X-RAY EXAM OF HAND: CPT | Mod: 26,LT

## 2019-06-23 RX ORDER — OXYCODONE AND ACETAMINOPHEN 5; 325 MG/1; MG/1
1 TABLET ORAL ONCE
Refills: 0 | Status: DISCONTINUED | OUTPATIENT
Start: 2019-06-23 | End: 2019-06-23

## 2019-06-23 RX ADMIN — OXYCODONE AND ACETAMINOPHEN 1 TABLET(S): 5; 325 TABLET ORAL at 16:04

## 2019-06-23 NOTE — ED PROVIDER NOTE - NSFOLLOWUPINSTRUCTIONS_ED_ALL_ED_FT
Follow up with orthopedist.    Wrist Fracture in Adults    WHAT YOU NEED TO KNOW:    A wrist fracture is a break in one or more of the bones in your wrist. Adult Arm Bones         DISCHARGE INSTRUCTIONS:    Return to the emergency department if:     Your pain gets worse or does not get better after you take pain medicine.      Your cast or splint breaks, gets wet, or is damaged.      Your hand or fingers feel numb or cold.       Your hand or fingers turn white or blue.       Your splint or cast feels too tight.       You have more pain or swelling after the cast or splint is put on.    Contact your healthcare provider if:     You have a fever.       There is a foul smell or blood coming from under the cast.      You have questions or concerns about your condition or care.    Medicines:     Prescription pain medicine may be given. Ask your healthcare provider how to take this medicine safely. Some prescription pain medicines contain acetaminophen. Do not take other medicines that contain acetaminophen without talking to your healthcare provider. Too much acetaminophen may cause liver damage. Prescription pain medicine may cause constipation. Ask your healthcare provider how to prevent or treat constipation.       NSAIDs, such as ibuprofen, help decrease swelling, pain, and fever. NSAIDs can cause stomach bleeding or kidney problems in certain people. If you take blood thinner medicine, always ask your healthcare provider if NSAIDs are safe for you. Always read the medicine label and follow directions.      Acetaminophen decreases pain and fever. It is available without a doctor's order. Ask how much to take and how often to take it. Follow directions. Read the labels of all other medicines you are using to see if they also contain acetaminophen, or ask your doctor or pharmacist. Acetaminophen can cause liver damage if not taken correctly. Do not use more than 4 grams (4,000 milligrams) total of acetaminophen in one day.       Take your medicine as directed. Contact your healthcare provider if you think your medicine is not helping or if you have side effects. Tell him or her if you are allergic to any medicine. Keep a list of the medicines, vitamins, and herbs you take. Include the amounts, and when and why you take them. Bring the list or the pill bottles to follow-up visits. Carry your medicine list with you in case of an emergency.    Self-care:     Rest as much as possible. Do not play contact sports until the healthcare provider says it is okay.       Apply ice on your wrist for 15 to 20 minutes every hour or as directed. Use an ice pack, or put crushed ice in a plastic bag. Cover it with a towel before you place it on your skin. Ice helps prevent tissue damage and decreases swelling and pain.      Elevate your wrist above the level of your heart as often as possible. This will help decrease swelling and pain. Prop your wrist on pillows or blankets to keep it elevated comfortably.     Cast or splint care:     You may take a bath or shower as directed. Do not let your cast or splint get wet. Before bathing, cover the cast or splint with 2 plastic trash bags. Tape the bags to your skin above the cast or splint to seal out the water. Keep your arm out of the water in case the bag breaks. If a plaster cast gets wet and soft, call your healthcare provider.       Check the skin around the cast or splint every day. You may put lotion on any red or sore areas.      Do not push down or lean on the cast or brace because it may break.      Do not scratch the skin under the cast by putting a sharp or pointed object inside the cast.    Go to physical therapy as directed: You may need physical therapy after your wrist heals and the cast is removed. A physical therapist can teach you exercises to help improve movement and strength and to decrease pain.     Follow up with your healthcare provider or bone specialist as directed: You may need to return to have your cast removed. You may also need an x-ray to check how well the bone has healed. Write down your questions so you remember to ask them during your visits.       © Copyright [employer] 2019 All illustrations and images included in CareNotes are the copyrighted property of [employer] or [employer].      Cast or Splint Care, Pediatric  Casts and splints are supports that are worn to protect broken bones and other injuries. A cast or splint may hold a bone still and in the correct position while it heals. Casts and splints may also help ease pain, swelling, and muscle spasms.    A cast is a hardened support that is usually made of fiberglass or plaster. It is custom-fit to the body and it offers more protection than a splint. It cannot be taken off and put back on. A splint is a type of soft support that is usually made from cloth and elastic. It can be adjusted or taken off as needed.    Your child may need a cast or a splint if he or she:  Has a broken bone.  Has a soft-tissue injury.  Needs to keep an injured body part from moving (keep it immobile) after surgery.  How to care for your child's cast  Do not allow your child to stick anything inside the cast to scratch the skin. Sticking something in the cast increases your child's risk of infection.  Check the skin around the cast every day. Tell your child's health care provider about any concerns.  You may put lotion on dry skin around the edges of the cast. Do not put lotion on the skin underneath the cast.  Keep the cast clean.  If the cast is not waterproof:  Do not let it get wet.  Cover it with a watertight covering when your child takes a bath or a shower.  How to care for your child's splint  Have your child wear it as told by your child's health care provider. Remove it only as told by your child's health care provider.  Loosen the splint if your child's fingers or toes tingle, become numb, or turn cold and blue.  Keep the splint clean.  If the splint is not waterproof:  Do not let it get wet.  Cover it with a watertight covering when your child takes a bath or a shower.  Follow these instructions at home:  Bathing     Do not have your child take baths or swim until his or her health care provider approves. Ask your child's health care provider if your child can take showers. Your child may only be allowed to take sponge baths for bathing.  If your child's cast or splint is not waterproof, cover it with a watertight covering when he or she takes a bath or shower.  Managing pain, stiffness, and swelling       Have your child move his or her fingers or toes often to avoid stiffness and to lessen swelling.  Have your child raise (elevate) the injured area above the level of his or her heart while he or she is sitting or lying down.  Safety     Do not allow your child to use the injured limb to support his or her body weight until your child's health care provider says that it is okay.  Have your child use crutches or other assistive devices as told by your child's health care provider.  General instructions     Do not allow your child to put pressure on any part of the cast or splint until it is fully hardened. This may take several hours.  Have your child return to his or her normal activities as told by his or her health care provider. Ask your child's health care provider what activities are safe for your child.  Give over-the-counter and prescription medicines only as told by your child's health care provider.  Keep all follow-up visits as told by your child’s health care provider. This is important.  Contact a health care provider if:  Your child’s cast or splint gets damaged.  Your child's skin under or around the cast becomes red or raw.  Your child’s skin under the cast is extremely itchy or painful.  Your child's cast or splint feels very uncomfortable.  Your child’s cast or splint is too tight or too loose.  Your child’s cast becomes wet or it develops a soft spot or area.  Your child gets an object stuck under the cast.  Get help right away if:  Your child's pain is getting worse.  Your child’s injured area tingles, becomes numb, or turns cold and blue.  The part of your child's body above or below the cast is swollen or discolored.  Your child cannot feel or move his or her fingers or toes.  There is fluid leaking through the cast.  Your child has severe pain or pressure under the cast.  This information is not intended to replace advice given to you by your health care provider. Make sure you discuss any questions you have with your health care provider.

## 2019-06-23 NOTE — ED PROCEDURE NOTE - CPROC ED POST PROC CARE GUIDE1
Instructed patient/caregiver to follow-up with primary care physician./Instructed patient/caregiver regarding signs and symptoms of infection./Elevate the injured extremity as instructed./Keep the cast/splint/dressing clean and dry./Verbal/written post procedure instructions were given to patient/caregiver.
Instructed patient/caregiver to follow-up with primary care physician./Instructed patient/caregiver regarding signs and symptoms of infection./Elevate the injured extremity as instructed./Verbal/written post procedure instructions were given to patient/caregiver./Keep the cast/splint/dressing clean and dry.

## 2019-06-23 NOTE — ED PROVIDER NOTE - CARE PLAN
Principal Discharge DX:	Wrist fracture, bilateral, closed, initial encounter  Secondary Diagnosis:	Status post fall

## 2019-06-23 NOTE — ED PROVIDER NOTE - MUSCULOSKELETAL, MLM
+ tenderness along left 1st MCP and radial aspect of left wrist ; + tenderness along radial and ulnar aspects of right wrist with associated swelling ; no elbow/shoulder/right hand tenderness

## 2019-06-23 NOTE — ED PROCEDURE NOTE - NS ED PERI VASCULAR NEG
no cyanosis of extremity/no paresthesia/capillary refill time < 2 seconds/no swelling/fingers/toes warm to touch
no cyanosis of extremity/capillary refill time < 2 seconds/no swelling/no paresthesia/fingers/toes warm to touch

## 2019-06-23 NOTE — ED PROVIDER NOTE - PROGRESS NOTE DETAILS
Patient offered admission as she is with b/l wrist fx's and may have difficulty performing ADL's on her own at home. She refuses admission stating that her  will help her out and they will acquire home care services on their own should it be deemed necessary by them. Strict return precautions advised. She will f/u with orthopedist tomorrow.

## 2019-06-23 NOTE — ED PROVIDER NOTE - CARE PROVIDER_API CALL
Angel Rose (MD)  Orthopaedic Surgery  [address] [place] [address]  Phone: [phone]  Fax: [phone]  Follow Up Time:

## 2019-06-23 NOTE — ED PROVIDER NOTE - ATTENDING CONTRIBUTION TO CARE
69 year old female, pmhx HTN, glaucoma, depression, chronic HA, presenting s/p mechanical fall yesterday on her bilateral wrists. States she was dancing at the time and slipped and fell, landing on her wrists. Denies head trauma or LOC. States she is now having pain and bilateral wrist swelling. Pain is described as sharp, non-radiating, worse with movement, relieved with rest, 9/10 at its worst. Denies other injuries or complaints.    Vital Signs: I have reviewed the initial vital signs.  Constitutional: NAD, well-nourished, appears stated age, no acute distress.  HEENT: Airway patent, moist MM, no erythema/swelling/deformity of oral structures. EOMI, PERRLA.  CV: regular rate, regular rhythm, well-perfused extremities, 2+ b/l DP and radial pulses equal.  Lungs: BCTA, no increased WOB.  ABD: NTND, no guarding or rebound, no pulsatile mass, no hernias.   MSK: Neck supple, nontender, nl ROM, no stepoff. Chest nontender. Back nontender in TLS spine or to b/l bony structures or flanks. (+) tenderness along left 1st MCP and radial aspect of left wrist ; (+) tenderness along radial and ulnar aspects of right wrist with associated swelling. Otherwise ext nontender, nl rom, no deformity.   INTEG: Skin warm, dry, no rash.  NEURO: A&Ox3, normal strength, nl sensation throughout, normal speech.   PSYCH: Calm, cooperative, normal affect and interaction.    Will xray wrists, pain control, re-eval.

## 2019-06-23 NOTE — ED PROVIDER NOTE - CLINICAL SUMMARY MEDICAL DECISION MAKING FREE TEXT BOX
I have discussed the discharge plan with the patient. The patient agrees with the plan, as discussed.  The patient understands Emergency Department diagnosis is a preliminary diagnosis often based on limited information and that the patient must adhere to the follow-up plan as discussed.  The patient understands that if the symptoms worsen or if prescribed medications do not have the desired/planned effect that the patient may return to the Emergency Department at any time for further evaluation and treatment. Patient presented s/p fall on her bilateral wrists. Otherwise no head trauma or LOC. Fully neuro intact, only complaining of bilateral wrist pain. Xrays of bilateral wrists showed minimally displaced bilateral wrist fx. Patient splinted in ED and remained neurovascular intact. Will give outpatient ortho follow up and patient agrees to call for appointment. Agrees to return to ED for any new or worsening symptoms.    I have discussed the discharge plan with the patient. The patient agrees with the plan, as discussed.  The patient understands Emergency Department diagnosis is a preliminary diagnosis often based on limited information and that the patient must adhere to the follow-up plan as discussed.  The patient understands that if the symptoms worsen or if prescribed medications do not have the desired/planned effect that the patient may return to the Emergency Department at any time for further evaluation and treatment.

## 2019-06-23 NOTE — ED PROVIDER NOTE - SKIN, MLM
+ ecchymoses along left hand ; remainder of visualized skin normal color for race, warm, dry and intact. No evidence of rash.

## 2019-06-23 NOTE — ED PROVIDER NOTE - OBJECTIVE STATEMENT
68 y/o F, PMHx HTN, Glaucoma, Depression & Chronic Headaches, presents to the ED s/p mechanical fall yesterday with complaints of b/l wrist pain. Patient was dancing and states that she slipped in her heels and fell on outstretched hands. She admits to pain and swelling b/l (right > left); denies any head injury/LOC, gait abnormality, hip pain, elbow/shoulder pain and additional injuries.

## 2019-07-03 PROBLEM — H40.9 UNSPECIFIED GLAUCOMA: Chronic | Status: ACTIVE | Noted: 2019-06-24

## 2019-07-03 PROBLEM — I10 ESSENTIAL (PRIMARY) HYPERTENSION: Chronic | Status: ACTIVE | Noted: 2019-06-24

## 2019-07-05 ENCOUNTER — OUTPATIENT (OUTPATIENT)
Dept: OUTPATIENT SERVICES | Facility: HOSPITAL | Age: 69
LOS: 1 days | Discharge: HOME | End: 2019-07-05
Payer: COMMERCIAL

## 2019-07-05 VITALS
RESPIRATION RATE: 17 BRPM | OXYGEN SATURATION: 100 % | SYSTOLIC BLOOD PRESSURE: 148 MMHG | TEMPERATURE: 97 F | HEIGHT: 66 IN | HEART RATE: 63 BPM | DIASTOLIC BLOOD PRESSURE: 88 MMHG | WEIGHT: 151.02 LBS

## 2019-07-05 DIAGNOSIS — S52.552D OTHER EXTRAARTICULAR FRACTURE OF LOWER END OF LEFT RADIUS, SUBSEQUENT ENCOUNTER FOR CLOSED FRACTURE WITH ROUTINE HEALING: ICD-10-CM

## 2019-07-05 DIAGNOSIS — Z01.818 ENCOUNTER FOR OTHER PREPROCEDURAL EXAMINATION: ICD-10-CM

## 2019-07-05 DIAGNOSIS — Z98.890 OTHER SPECIFIED POSTPROCEDURAL STATES: Chronic | ICD-10-CM

## 2019-07-05 DIAGNOSIS — Z98.49 CATARACT EXTRACTION STATUS, UNSPECIFIED EYE: Chronic | ICD-10-CM

## 2019-07-05 LAB
ALBUMIN SERPL ELPH-MCNC: 4.8 G/DL — SIGNIFICANT CHANGE UP (ref 3.5–5.2)
ALP SERPL-CCNC: 138 U/L — HIGH (ref 30–115)
ALT FLD-CCNC: 45 U/L — HIGH (ref 0–41)
ANION GAP SERPL CALC-SCNC: 15 MMOL/L — HIGH (ref 7–14)
APPEARANCE UR: ABNORMAL
APTT BLD: 31.8 SEC — SIGNIFICANT CHANGE UP (ref 27–39.2)
AST SERPL-CCNC: 28 U/L — SIGNIFICANT CHANGE UP (ref 0–41)
BASOPHILS # BLD AUTO: 0.03 K/UL — SIGNIFICANT CHANGE UP (ref 0–0.2)
BASOPHILS NFR BLD AUTO: 0.4 % — SIGNIFICANT CHANGE UP (ref 0–1)
BILIRUB SERPL-MCNC: 0.4 MG/DL — SIGNIFICANT CHANGE UP (ref 0.2–1.2)
BILIRUB UR-MCNC: NEGATIVE — SIGNIFICANT CHANGE UP
BUN SERPL-MCNC: 23 MG/DL — HIGH (ref 10–20)
CALCIUM SERPL-MCNC: 11.2 MG/DL — HIGH (ref 8.5–10.1)
CHLORIDE SERPL-SCNC: 104 MMOL/L — SIGNIFICANT CHANGE UP (ref 98–110)
CO2 SERPL-SCNC: 21 MMOL/L — SIGNIFICANT CHANGE UP (ref 17–32)
COLOR SPEC: YELLOW — SIGNIFICANT CHANGE UP
CREAT SERPL-MCNC: 0.9 MG/DL — SIGNIFICANT CHANGE UP (ref 0.7–1.5)
DIFF PNL FLD: NEGATIVE — SIGNIFICANT CHANGE UP
EOSINOPHIL # BLD AUTO: 0.16 K/UL — SIGNIFICANT CHANGE UP (ref 0–0.7)
EOSINOPHIL NFR BLD AUTO: 2.1 % — SIGNIFICANT CHANGE UP (ref 0–8)
GLUCOSE SERPL-MCNC: 90 MG/DL — SIGNIFICANT CHANGE UP (ref 70–99)
GLUCOSE UR QL: NEGATIVE MG/DL — SIGNIFICANT CHANGE UP
HCT VFR BLD CALC: 43.7 % — SIGNIFICANT CHANGE UP (ref 37–47)
HGB BLD-MCNC: 14.4 G/DL — SIGNIFICANT CHANGE UP (ref 12–16)
IMM GRANULOCYTES NFR BLD AUTO: 0.1 % — SIGNIFICANT CHANGE UP (ref 0.1–0.3)
INR BLD: 0.89 RATIO — SIGNIFICANT CHANGE UP (ref 0.65–1.3)
KETONES UR-MCNC: NEGATIVE — SIGNIFICANT CHANGE UP
LEUKOCYTE ESTERASE UR-ACNC: ABNORMAL
LYMPHOCYTES # BLD AUTO: 3.04 K/UL — SIGNIFICANT CHANGE UP (ref 1.2–3.4)
LYMPHOCYTES # BLD AUTO: 40.1 % — SIGNIFICANT CHANGE UP (ref 20.5–51.1)
MCHC RBC-ENTMCNC: 30.3 PG — SIGNIFICANT CHANGE UP (ref 27–31)
MCHC RBC-ENTMCNC: 33 G/DL — SIGNIFICANT CHANGE UP (ref 32–37)
MCV RBC AUTO: 91.8 FL — SIGNIFICANT CHANGE UP (ref 81–99)
MONOCYTES # BLD AUTO: 0.79 K/UL — HIGH (ref 0.1–0.6)
MONOCYTES NFR BLD AUTO: 10.4 % — HIGH (ref 1.7–9.3)
NEUTROPHILS # BLD AUTO: 3.55 K/UL — SIGNIFICANT CHANGE UP (ref 1.4–6.5)
NEUTROPHILS NFR BLD AUTO: 46.9 % — SIGNIFICANT CHANGE UP (ref 42.2–75.2)
NITRITE UR-MCNC: NEGATIVE — SIGNIFICANT CHANGE UP
NRBC # BLD: 0 /100 WBCS — SIGNIFICANT CHANGE UP (ref 0–0)
PH UR: 6 — SIGNIFICANT CHANGE UP (ref 5–8)
PLATELET # BLD AUTO: 307 K/UL — SIGNIFICANT CHANGE UP (ref 130–400)
POTASSIUM SERPL-MCNC: 5 MMOL/L — SIGNIFICANT CHANGE UP (ref 3.5–5)
POTASSIUM SERPL-SCNC: 5 MMOL/L — SIGNIFICANT CHANGE UP (ref 3.5–5)
PROT SERPL-MCNC: 7.6 G/DL — SIGNIFICANT CHANGE UP (ref 6–8)
PROT UR-MCNC: NEGATIVE MG/DL — SIGNIFICANT CHANGE UP
PROTHROM AB SERPL-ACNC: 10.2 SEC — SIGNIFICANT CHANGE UP (ref 9.95–12.87)
RBC # BLD: 4.76 M/UL — SIGNIFICANT CHANGE UP (ref 4.2–5.4)
RBC # FLD: 12.7 % — SIGNIFICANT CHANGE UP (ref 11.5–14.5)
SODIUM SERPL-SCNC: 140 MMOL/L — SIGNIFICANT CHANGE UP (ref 135–146)
SP GR SPEC: 1.02 — SIGNIFICANT CHANGE UP (ref 1.01–1.03)
UROBILINOGEN FLD QL: 0.2 MG/DL — SIGNIFICANT CHANGE UP (ref 0.2–0.2)
WBC # BLD: 7.58 K/UL — SIGNIFICANT CHANGE UP (ref 4.8–10.8)
WBC # FLD AUTO: 7.58 K/UL — SIGNIFICANT CHANGE UP (ref 4.8–10.8)

## 2019-07-05 PROCEDURE — 93010 ELECTROCARDIOGRAM REPORT: CPT

## 2019-07-05 PROCEDURE — 71046 X-RAY EXAM CHEST 2 VIEWS: CPT | Mod: 26

## 2019-07-05 PROCEDURE — 72050 X-RAY EXAM NECK SPINE 4/5VWS: CPT | Mod: 26

## 2019-07-05 NOTE — H&P PST ADULT - NSICDXPASTMEDICALHX_GEN_ALL_CORE_FT
PAST MEDICAL HISTORY:  Anxiety     Asthma     Depression     Glaucoma     H/O hypoparathyroidism     Hiatal hernia with GERD     Hypertension     Neck pain

## 2019-07-05 NOTE — H&P PST ADULT - NSICDXPASTSURGICALHX_GEN_ALL_CORE_FT
PAST SURGICAL HISTORY:  H/O rhinoplasty 2014    S/P cataract surgery 2013    S/P laparotomy [id number]

## 2019-07-05 NOTE — H&P PST ADULT - NSANTHOSAYNRD_GEN_A_CORE
No. LION screening performed.  STOP BANG Legend: 0-2 = LOW Risk; 3-4 = INTERMEDIATE Risk; 5-8 = HIGH Risk

## 2019-07-05 NOTE — H&P PST ADULT - REASON FOR ADMISSION
69 yr old female with recent fx of RT wrist. Pt electing to have open reduction internal fixation or Right wrist with Dr. Pascal 07/10/19 69 yr old female with recent fall from dancing sustained  Bilateral  wrist fractures. Pt electing to have open reduction internal fixation or Right wrist with Dr. Pascal 07/10/19

## 2019-07-05 NOTE — H&P PST ADULT - HISTORY OF PRESENT ILLNESS
69 yr old female with recent fx of RT wrist. Pt electing to have open reduction internal fixation or Right wrist  PT denies any CP, SOB, Palpitations or Dysuria  Pt states she can climb 1 FOS with no SOB  Pt denies LION      As per pt this is a complete medical and surgical assessment including prescribed and OTC  medications 69 yr old female recent fall from dancing sustained  Bilateral  wrist fractures. Pt electing to have open reduction internal fixation or Right wrist  PT denies any CP, SOB, Palpitations or Dysuria  Pt states she can climb 1 FOS with no SOB  Pt denies LION      As per pt this is a complete medical and surgical assessment including prescribed and OTC  medications 69 yr old female recent fall from dancing sustained  Bilateral  wrist fractures. Pt electing to have open reduction internal fixation or Right wrist  PT denies any CP, SOB, Palpitations or Dysuria  Pt states she can climb 1 FOS with no SOB  Pt denies LION    Pt could not urinate. due to her renal issues she only goes in am and at nite. Pt given cup to bring in  Monday  with her am urine      As per pt this is a complete medical and surgical assessment including prescribed and OTC  medications 69 yr old female recent fall from dancing sustained  Bilateral  wrist fractures. Pt electing to have open reduction internal fixation or Right wrist  PT denies any CP, SOB, Palpitations or Dysuria  Pt states she can climb 1 FOS with no SOB  Pt denies LION          As per pt this is a complete medical and surgical assessment including prescribed and OTC  medications

## 2019-07-07 LAB
-  AMPICILLIN/SULBACTAM: SIGNIFICANT CHANGE UP
-  CEFAZOLIN: SIGNIFICANT CHANGE UP
-  GENTAMICIN: SIGNIFICANT CHANGE UP
-  OXACILLIN: SIGNIFICANT CHANGE UP
-  PENICILLIN: SIGNIFICANT CHANGE UP
-  RIFAMPIN: SIGNIFICANT CHANGE UP
-  TETRACYCLINE: SIGNIFICANT CHANGE UP
-  TRIMETHOPRIM/SULFAMETHOXAZOLE: SIGNIFICANT CHANGE UP
-  VANCOMYCIN: SIGNIFICANT CHANGE UP
CULTURE RESULTS: SIGNIFICANT CHANGE UP
METHOD TYPE: SIGNIFICANT CHANGE UP
ORGANISM # SPEC MICROSCOPIC CNT: SIGNIFICANT CHANGE UP
ORGANISM # SPEC MICROSCOPIC CNT: SIGNIFICANT CHANGE UP
SPECIMEN SOURCE: SIGNIFICANT CHANGE UP

## 2019-07-09 NOTE — ASU PATIENT PROFILE, ADULT - PSH
H/O bilateral oophorectomy    H/O rhinoplasty  2014  S/P cataract surgery  2013  S/P laparotomy  [id number]

## 2019-07-09 NOTE — ASU PATIENT PROFILE, ADULT - PMH
Anxiety    Asthma    Depression    Glaucoma    H/O hypoparathyroidism    Hiatal hernia with GERD    Hypertension    Neck pain

## 2019-07-10 ENCOUNTER — OUTPATIENT (OUTPATIENT)
Dept: OUTPATIENT SERVICES | Facility: HOSPITAL | Age: 69
LOS: 1 days | Discharge: HOME | End: 2019-07-10

## 2019-07-10 VITALS
RESPIRATION RATE: 20 BRPM | HEART RATE: 67 BPM | SYSTOLIC BLOOD PRESSURE: 131 MMHG | OXYGEN SATURATION: 99 % | DIASTOLIC BLOOD PRESSURE: 59 MMHG

## 2019-07-10 VITALS
HEART RATE: 60 BPM | TEMPERATURE: 98 F | HEIGHT: 66 IN | OXYGEN SATURATION: 99 % | WEIGHT: 151.02 LBS | DIASTOLIC BLOOD PRESSURE: 81 MMHG | RESPIRATION RATE: 18 BRPM | SYSTOLIC BLOOD PRESSURE: 170 MMHG

## 2019-07-10 DIAGNOSIS — Z90.722 ACQUIRED ABSENCE OF OVARIES, BILATERAL: Chronic | ICD-10-CM

## 2019-07-10 DIAGNOSIS — Z98.890 OTHER SPECIFIED POSTPROCEDURAL STATES: Chronic | ICD-10-CM

## 2019-07-10 DIAGNOSIS — Z98.49 CATARACT EXTRACTION STATUS, UNSPECIFIED EYE: Chronic | ICD-10-CM

## 2019-07-10 RX ORDER — OXYCODONE AND ACETAMINOPHEN 5; 325 MG/1; MG/1
1 TABLET ORAL ONCE
Refills: 0 | Status: DISCONTINUED | OUTPATIENT
Start: 2019-07-10 | End: 2019-07-10

## 2019-07-10 RX ORDER — METHYLPHENIDATE HCL 5 MG
1 TABLET ORAL
Qty: 0 | Refills: 0 | DISCHARGE

## 2019-07-10 RX ORDER — SODIUM CHLORIDE 9 MG/ML
1000 INJECTION, SOLUTION INTRAVENOUS
Refills: 0 | Status: DISCONTINUED | OUTPATIENT
Start: 2019-07-10 | End: 2019-07-25

## 2019-07-10 RX ORDER — TAPENTADOL HYDROCHLORIDE 50 MG/1
1 TABLET, FILM COATED ORAL
Qty: 0 | Refills: 0 | DISCHARGE

## 2019-07-10 RX ORDER — FLUVOXAMINE MALEATE 25 MG/1
1 TABLET ORAL
Qty: 0 | Refills: 0 | DISCHARGE

## 2019-07-10 RX ORDER — ALPRAZOLAM 0.25 MG
1 TABLET ORAL
Qty: 0 | Refills: 0 | DISCHARGE

## 2019-07-10 RX ORDER — LOSARTAN POTASSIUM 100 MG/1
1 TABLET, FILM COATED ORAL
Qty: 0 | Refills: 0 | DISCHARGE

## 2019-07-10 RX ORDER — OMEGA-3 ACID ETHYL ESTERS 1 G
1 CAPSULE ORAL
Qty: 0 | Refills: 0 | DISCHARGE

## 2019-07-10 RX ORDER — CYCLOSPORINE 0.5 MG/ML
1 EMULSION OPHTHALMIC
Qty: 0 | Refills: 0 | DISCHARGE

## 2019-07-10 RX ORDER — CHOLECALCIFEROL (VITAMIN D3) 125 MCG
1 CAPSULE ORAL
Qty: 0 | Refills: 0 | DISCHARGE

## 2019-07-10 RX ORDER — CALCIUM CARBONATE 500(1250)
2 TABLET ORAL
Qty: 0 | Refills: 0 | DISCHARGE

## 2019-07-10 RX ORDER — ALBUTEROL 90 UG/1
2 AEROSOL, METERED ORAL
Qty: 0 | Refills: 0 | DISCHARGE

## 2019-07-10 RX ORDER — RANITIDINE HYDROCHLORIDE 150 MG/1
1 TABLET, FILM COATED ORAL
Qty: 0 | Refills: 0 | DISCHARGE

## 2019-07-10 RX ORDER — ONDANSETRON 8 MG/1
4 TABLET, FILM COATED ORAL ONCE
Refills: 0 | Status: DISCONTINUED | OUTPATIENT
Start: 2019-07-10 | End: 2019-07-25

## 2019-07-10 RX ORDER — HYDROMORPHONE HYDROCHLORIDE 2 MG/ML
0.5 INJECTION INTRAMUSCULAR; INTRAVENOUS; SUBCUTANEOUS
Refills: 0 | Status: DISCONTINUED | OUTPATIENT
Start: 2019-07-10 | End: 2019-07-10

## 2019-07-10 RX ORDER — MONTELUKAST 4 MG/1
1 TABLET, CHEWABLE ORAL
Qty: 0 | Refills: 0 | DISCHARGE

## 2019-07-10 RX ORDER — BIMATOPROST 0.3 MG/ML
1 SOLUTION/ DROPS OPHTHALMIC
Qty: 0 | Refills: 0 | DISCHARGE

## 2019-07-10 RX ORDER — NITROFURANTOIN MACROCRYSTAL 50 MG
1 CAPSULE ORAL
Qty: 0 | Refills: 0 | DISCHARGE

## 2019-07-10 RX ADMIN — HYDROMORPHONE HYDROCHLORIDE 0.5 MILLIGRAM(S): 2 INJECTION INTRAMUSCULAR; INTRAVENOUS; SUBCUTANEOUS at 13:19

## 2019-07-10 RX ADMIN — HYDROMORPHONE HYDROCHLORIDE 0.5 MILLIGRAM(S): 2 INJECTION INTRAMUSCULAR; INTRAVENOUS; SUBCUTANEOUS at 13:25

## 2019-07-10 RX ADMIN — HYDROMORPHONE HYDROCHLORIDE 0.5 MILLIGRAM(S): 2 INJECTION INTRAMUSCULAR; INTRAVENOUS; SUBCUTANEOUS at 13:02

## 2019-07-10 RX ADMIN — HYDROMORPHONE HYDROCHLORIDE 0.5 MILLIGRAM(S): 2 INJECTION INTRAMUSCULAR; INTRAVENOUS; SUBCUTANEOUS at 12:48

## 2019-07-10 RX ADMIN — SODIUM CHLORIDE 100 MILLILITER(S): 9 INJECTION, SOLUTION INTRAVENOUS at 12:38

## 2019-07-10 RX ADMIN — HYDROMORPHONE HYDROCHLORIDE 0.5 MILLIGRAM(S): 2 INJECTION INTRAMUSCULAR; INTRAVENOUS; SUBCUTANEOUS at 13:18

## 2019-07-10 NOTE — BRIEF OPERATIVE NOTE - NSICDXBRIEFPOSTOP_GEN_ALL_CORE_FT
POST-OP DIAGNOSIS:  Oth intartic fracture of lower end of right radius, init 10-Jul-2019 10:44:04  Sen Pascal

## 2019-07-10 NOTE — BRIEF OPERATIVE NOTE - NSICDXBRIEFPROCEDURE_GEN_ALL_CORE_FT
PROCEDURES:  Treatment of fracture of radius with 3 or more fragments 10-Jul-2019 10:43:33  Sen Pascal

## 2019-07-10 NOTE — ASU DISCHARGE PLAN (ADULT/PEDIATRIC) - ASU DC SPECIAL INSTRUCTIONSFT
DIET:    Resume normal diet  No alcoholic  beverages for 24 hours or if on prescribed narcotic pain medications.    MEDICATION:    Resume your preoperative oral medications.  Check with your physician before starting aspirin, Coumadin, or other blood thinners.  Prescriptions given to you - take as directed.      ACTIVITY:    Rest today and limit your activities for 24 hours.  Do not drive or operate machinery for 24 hours - if you received anesthesia.  When taking pain medication, be careful as you walk or climb stairs.  It is not advisable to drive while taking prescribed pain medication.    SPECIAL INSTRUCTIONS:    __x___ Elevate operative site above heart level or as directed.  ___x__ Apply ice to operative site as directed.  __x___ Use  sling as directed.  ___x__ Exercise fingers.    DRESSING CARE:    _____ You may change the dressing 4 days. Keep wound covered with band-aids.  __x___ Do not change the dressing until your doctor see you.  _____ You can loosen or rewrap the dressing.  _____  Keep dressing clean and dry.  _____ You may shower in _____ day(s) with the extremity covered by a plastic bag.  _____ OK to wash hand , including showers, in _____ day(s).    ADDITIONAL  INFORMATION:    Post operative visit should be scheduled for next week.  If you are not aware of visit please contact office.  If you have any questions or concerns call office at      Notify your doctor if you develop   Fever  Excessive Swelling  Chills   Drainage   Pain not controlled by medication  Persistent numbness in hand or fingers    If an Emergency arises call 911 and/or go to the Emergency Room

## 2019-07-10 NOTE — BRIEF OPERATIVE NOTE - NSICDXBRIEFPREOP_GEN_ALL_CORE_FT
PRE-OP DIAGNOSIS:  Oth intartic fracture of lower end of right radius, init 10-Jul-2019 10:43:51  Sen Pascal

## 2019-07-10 NOTE — CHART NOTE - NSCHARTNOTEFT_GEN_A_CORE
PACU ANESTHESIA ADMISSION NOTE      Procedure: Treatment of fracture of radius with 3 or more fragments        ____  Intubated  TV:______       Rate: ______      FiO2: ______    __x__  Patent Airway    ____  Full return of protective reflexes    ____  Full recovery from anesthesia / back to baseline status    Vitals:  T(C): 36.5   HR: 60   BP: 148/75  RR: 18   SpO2: 99%     Mental Status:  __x__ Awake   _____ Alert   _____ Drowsy   _____ Sedated    Nausea/Vomiting:  ____ Yes, See Post - Op Orders      __x__ No    Pain Scale (0-10):  _____    Treatment: ____ None    __x__ See Post - Op/PCA Orders    Post - Operative Fluids:   ____ Oral   __x__ See Post - Op Orders    Plan: Discharge:   __x__Home       _____Floor     _____Critical Care    _____  Other:_________________    Comments:  report given to RN

## 2019-07-13 DIAGNOSIS — G43.909 MIGRAINE, UNSPECIFIED, NOT INTRACTABLE, WITHOUT STATUS MIGRAINOSUS: ICD-10-CM

## 2019-07-13 DIAGNOSIS — Z88.0 ALLERGY STATUS TO PENICILLIN: ICD-10-CM
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
## 2019-07-13 DIAGNOSIS — I10 ESSENTIAL (PRIMARY) HYPERTENSION: ICD-10-CM

## 2019-07-13 DIAGNOSIS — S52.571A OTHER INTRAARTICULAR FRACTURE OF LOWER END OF RIGHT RADIUS, INITIAL ENCOUNTER FOR CLOSED FRACTURE: ICD-10-CM

## 2019-07-13 DIAGNOSIS — Z87.891 PERSONAL HISTORY OF NICOTINE DEPENDENCE: ICD-10-CM

## 2019-07-13 DIAGNOSIS — Z79.51 LONG TERM (CURRENT) USE OF INHALED STEROIDS: ICD-10-CM

## 2019-07-13 DIAGNOSIS — K21.9 GASTRO-ESOPHAGEAL REFLUX DISEASE WITHOUT ESOPHAGITIS: ICD-10-CM
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
## 2019-07-13 DIAGNOSIS — J45.909 UNSPECIFIED ASTHMA, UNCOMPLICATED: ICD-10-CM

## 2019-07-13 DIAGNOSIS — Z88.2 ALLERGY STATUS TO SULFONAMIDES: ICD-10-CM

## 2019-07-13 DIAGNOSIS — S52.552A OTHER EXTRAARTICULAR FRACTURE OF LOWER END OF LEFT RADIUS, INITIAL ENCOUNTER FOR CLOSED FRACTURE: ICD-10-CM

## 2019-08-13 ENCOUNTER — OFFICE VISIT (OUTPATIENT)
Dept: PSYCHIATRY | Facility: CLINIC | Age: 69
End: 2019-08-13
Payer: COMMERCIAL

## 2019-08-13 DIAGNOSIS — F33.40 RECURRENT MAJOR DEPRESSIVE DISORDER, IN REMISSION (HCC): ICD-10-CM

## 2019-08-13 DIAGNOSIS — F90.2 ATTENTION DEFICIT HYPERACTIVITY DISORDER (ADHD), COMBINED TYPE: ICD-10-CM

## 2019-08-13 PROCEDURE — 99213 OFFICE O/P EST LOW 20 MIN: CPT | Performed by: PSYCHIATRY & NEUROLOGY

## 2019-08-13 RX ORDER — FLUVOXAMINE MALEATE 100 MG
300 TABLET ORAL
Qty: 270 TABLET | Refills: 1 | Status: SHIPPED | OUTPATIENT
Start: 2019-08-13 | End: 2019-11-27 | Stop reason: SDUPTHER

## 2019-08-13 RX ORDER — METHYLPHENIDATE HYDROCHLORIDE 40 MG/1
40 CAPSULE, EXTENDED RELEASE ORAL DAILY
Qty: 30 CAPSULE | Refills: 0 | Status: SHIPPED | OUTPATIENT
Start: 2019-08-13 | End: 2019-11-27 | Stop reason: SDUPTHER

## 2019-08-13 RX ORDER — METHYLPHENIDATE HYDROCHLORIDE 40 MG/1
40 CAPSULE, EXTENDED RELEASE ORAL DAILY
Qty: 30 CAPSULE | Refills: 0 | Status: SHIPPED | OUTPATIENT
Start: 2019-08-13 | End: 2019-08-13 | Stop reason: SDUPTHER

## 2019-08-13 RX ORDER — ALPRAZOLAM 0.5 MG/1
0.5 TABLET ORAL
Qty: 90 TABLET | Refills: 1 | Status: SHIPPED | OUTPATIENT
Start: 2019-08-13 | End: 2019-11-27 | Stop reason: SDUPTHER

## 2019-08-13 NOTE — PSYCH
Patient ID: Mars Zhu is a 81 y o  female  HPI ROS Appetite Changes and Sleep:Normal appetite, Low energy level ,normal sleep    Review Of Systems:     Mood Dysphoric   Thought Content Disturbing Thoughts, Feelings   General Emotional Problems and tired all the time   Gastrointestinal Normal   Neurological Normal        Laboratory Results: No results found for this or any previous visit  Subjective:    Seen for depression and anxiety  She had a mechanical fall with wrist fracture  Oldest brother who was estranged from the family   Although they were not close ,she is taking it hard She has hypercalcemia due to parathyroid disease  Feels tired all the time      Objective:     stable  Mental status:  Appearance calm and cooperative , adequate hygiene and grooming and good eye contact    Mood dysphoric   Affect affect appropriate    Speech Normal rate and Normal volume   Thought Processes coherent/organized   Hallucinations no hallucinations present    Thought Content no delusional thoughts verbalized   Abnormal Thoughts no suicidal thoughts  and no homicidal thoughts    Orientation A+O x 3   Memory function Not tested   Attention Span concentration intact   Intellect Not Formally Assessed   Insight Insight intact   Judgement judgment was intact   Muscle Strength Muscle strength and tone were normal and Normal gait    Language no difficulty naming common objects, no difficulty repeating a phrase  and no difficulty writing a sentence    Pain        Assessment/Plan:       Diagnoses and all orders for this visit:    Recurrent major depressive disorder, in remission ([facility] [address] )  -     ALPRAZolam (XANAX) 0 5 mg tablet; Take 1 tablet (0 5 mg total) by mouth daily at bedtime as needed for anxiety  -     fluvoxaMINE (LUVOX) 100 mg tablet;  Take 3 tablets (300 mg total) by mouth daily at bedtime    Attention deficit hyperactivity disorder (ADHD), combined type  -     Discontinue: methylphenidate ([address] CD) 40 MG CR capsule; Take 1 capsule (40 mg total) by mouth dailyMax Daily Amount: 40 mg  -     Discontinue: methylphenidate (METADATE CD) 40 MG CR capsule; Take 1 capsule (40 mg total) by mouth daily Fill on or after 9/13/2019Max Daily Amount: 40 mg  -     methylphenidate (METADATE CD) 40 MG CR capsule;  Take 1 capsule (40 mg total) by mouth daily Fill on or after 10/13/2019Max Daily Amount: 40 mg            Treatment Recommendations- Risks Benefits    Returned to the office in 3 months  Risks, Benefits And Possible Side Effects Of Medications:

## 2019-11-08 PROBLEM — J45.909 UNSPECIFIED ASTHMA, UNCOMPLICATED: Chronic | Status: ACTIVE | Noted: 2019-07-05

## 2019-11-08 PROBLEM — Z86.39 PERSONAL HISTORY OF OTHER ENDOCRINE, NUTRITIONAL AND METABOLIC DISEASE: Chronic | Status: ACTIVE | Noted: 2019-07-05

## 2019-11-08 PROBLEM — F41.9 ANXIETY DISORDER, UNSPECIFIED: Chronic | Status: ACTIVE | Noted: 2019-07-05

## 2019-11-08 PROBLEM — M54.2 CERVICALGIA: Chronic | Status: ACTIVE | Noted: 2019-07-05

## 2019-11-08 PROBLEM — F32.9 MAJOR DEPRESSIVE DISORDER, SINGLE EPISODE, UNSPECIFIED: Chronic | Status: ACTIVE | Noted: 2019-07-05

## 2019-11-08 PROBLEM — K21.9 GASTRO-ESOPHAGEAL REFLUX DISEASE WITHOUT ESOPHAGITIS: Chronic | Status: ACTIVE | Noted: 2019-07-05

## 2019-11-27 ENCOUNTER — OFFICE VISIT (OUTPATIENT)
Dept: PSYCHIATRY | Facility: CLINIC | Age: 69
End: 2019-11-27
Payer: COMMERCIAL

## 2019-11-27 DIAGNOSIS — F90.2 ATTENTION DEFICIT HYPERACTIVITY DISORDER (ADHD), COMBINED TYPE: ICD-10-CM

## 2019-11-27 DIAGNOSIS — F33.40 RECURRENT MAJOR DEPRESSIVE DISORDER, IN REMISSION (HCC): ICD-10-CM

## 2019-11-27 PROCEDURE — 99213 OFFICE O/P EST LOW 20 MIN: CPT | Performed by: PSYCHIATRY & NEUROLOGY

## 2019-11-27 RX ORDER — ALPRAZOLAM 0.5 MG/1
0.5 TABLET ORAL
Qty: 90 TABLET | Refills: 1 | Status: SHIPPED | OUTPATIENT
Start: 2019-11-27 | End: 2019-11-27 | Stop reason: SDUPTHER

## 2019-11-27 RX ORDER — FLUVOXAMINE MALEATE 100 MG
300 TABLET ORAL
Qty: 270 TABLET | Refills: 1 | Status: SHIPPED | OUTPATIENT
Start: 2019-11-27 | End: 2020-03-17 | Stop reason: SDUPTHER

## 2019-11-27 RX ORDER — METHYLPHENIDATE HYDROCHLORIDE 40 MG/1
40 CAPSULE, EXTENDED RELEASE ORAL DAILY
Qty: 30 CAPSULE | Refills: 0 | Status: SHIPPED | OUTPATIENT
Start: 2019-11-27 | End: 2019-11-27 | Stop reason: SDUPTHER

## 2019-11-27 RX ORDER — ALPRAZOLAM 0.5 MG/1
0.5 TABLET ORAL
Qty: 90 TABLET | Refills: 1 | Status: SHIPPED | OUTPATIENT
Start: 2019-11-27 | End: 2020-03-17 | Stop reason: SDUPTHER

## 2019-11-27 RX ORDER — METHYLPHENIDATE HYDROCHLORIDE 40 MG/1
40 CAPSULE, EXTENDED RELEASE ORAL DAILY
Qty: 30 CAPSULE | Refills: 0 | Status: SHIPPED | OUTPATIENT
Start: 2019-11-27 | End: 2020-03-17 | Stop reason: SDUPTHER

## 2019-11-27 RX ORDER — FLUVOXAMINE MALEATE 100 MG
300 TABLET ORAL
Qty: 270 TABLET | Refills: 1 | Status: SHIPPED | OUTPATIENT
Start: 2019-11-27 | End: 2019-11-27 | Stop reason: SDUPTHER

## 2019-11-27 NOTE — PSYCH
Patient ID: Raghav Hu is a 81 y o  female  Review Of Systems:     Mood Anxious and Depressed   Thought Content Normal   General As HPI   Physical symptoms As Noted in HPI       Laboratory Results: No results found for this or any previous visit  Subjective:    Seen for anxiety and depression  She is having trouble with insomnia  Her sleep pattern is quite erratic  In the past 48 hours she has slept for only 3 hours  She also reports bad dream related to work  Appetite is normal  Some sadness with occasional crying spells  Some grief related to brother's passing  She is going for another parathyroid evaluation  Objective:     Dysphoric mood  Mental status:  Appearance calm and cooperative  and good eye contact    Mood dysphoric   Affect affect appropriate    Speech Normal rate and Normal volume   Thought Processes coherent/organized   Hallucinations no hallucinations present    Thought Content no delusional thoughts verbalized   Abnormal Thoughts no suicidal thoughts  and no homicidal thoughts    Orientation A+O x 3       Assessment/Plan:       Diagnoses and all orders for this visit:    Recurrent major depressive disorder, in remission ([facility] [address] )  -     Discontinue: ALPRAZolam (XANAX) 0 5 mg tablet; Take 1 tablet (0 5 mg total) by mouth daily at bedtime as needed for anxiety  -     Discontinue: fluvoxaMINE (LUVOX) 100 mg tablet; Take 3 tablets (300 mg total) by mouth daily at bedtime  -     ALPRAZolam (XANAX) 0 5 mg tablet; Take 1 tablet (0 5 mg total) by mouth daily at bedtime as needed for anxiety  -     fluvoxaMINE (LUVOX) 100 mg tablet; Take 3 tablets (300 mg total) by mouth daily at bedtime    Attention deficit hyperactivity disorder (ADHD), combined type  -     Discontinue: methylphenidate (METADATE CD) 40 MG CR capsule; Take 1 capsule (40 mg total) by mouth daily Fill on or after 10/13/2019Max Daily Amount: 40 mg  -     methylphenidate (METADATE CD) 40 MG CR capsule;  Take 1 capsule (40 mg total) by mouth daily Fill on or after 10/13/2019Max Daily Amount: 40 mg        1  [facility]    2   Attention deficit hyperactivity disorder (ADHD), combined type        Treatment Recommendations- Risks Benefits      Risks, Benefits And Possible Side Effects Of Medications:  Risks, benefits, and possible side effects of medications explained to patient and patient verbalizes understanding

## 2020-03-17 DIAGNOSIS — F90.2 ATTENTION DEFICIT HYPERACTIVITY DISORDER (ADHD), COMBINED TYPE: ICD-10-CM

## 2020-03-17 DIAGNOSIS — F33.40 RECURRENT MAJOR DEPRESSIVE DISORDER, IN REMISSION (HCC): ICD-10-CM

## 2020-03-18 RX ORDER — ALPRAZOLAM 0.5 MG/1
0.5 TABLET ORAL
Qty: 90 TABLET | Refills: 1 | Status: SHIPPED | OUTPATIENT
Start: 2020-03-18

## 2020-03-18 RX ORDER — FLUVOXAMINE MALEATE 100 MG
300 TABLET ORAL
Qty: 270 TABLET | Refills: 1 | Status: SHIPPED | OUTPATIENT
Start: 2020-03-18

## 2020-03-18 RX ORDER — METHYLPHENIDATE HYDROCHLORIDE 40 MG/1
40 CAPSULE, EXTENDED RELEASE ORAL DAILY
Qty: 30 CAPSULE | Refills: 0 | Status: SHIPPED | OUTPATIENT
Start: 2020-03-18 | End: 2020-04-13 | Stop reason: SDUPTHER

## 2020-04-13 ENCOUNTER — TELEMEDICINE (OUTPATIENT)
Dept: PSYCHIATRY | Facility: CLINIC | Age: 70
End: 2020-04-13
Payer: COMMERCIAL

## 2020-04-13 DIAGNOSIS — F33.40 RECURRENT MAJOR DEPRESSIVE DISORDER, IN REMISSION (HCC): ICD-10-CM

## 2020-04-13 DIAGNOSIS — F90.2 ATTENTION DEFICIT HYPERACTIVITY DISORDER (ADHD), COMBINED TYPE: Primary | ICD-10-CM

## 2020-04-13 PROCEDURE — 99213 OFFICE O/P EST LOW 20 MIN: CPT | Performed by: PSYCHIATRY & NEUROLOGY

## 2020-04-13 RX ORDER — METHYLPHENIDATE HYDROCHLORIDE 40 MG/1
40 CAPSULE, EXTENDED RELEASE ORAL DAILY
Qty: 30 CAPSULE | Refills: 0 | Status: SHIPPED | OUTPATIENT
Start: 2020-04-13